# Patient Record
Sex: FEMALE | Race: WHITE | Employment: FULL TIME | URBAN - METROPOLITAN AREA
[De-identification: names, ages, dates, MRNs, and addresses within clinical notes are randomized per-mention and may not be internally consistent; named-entity substitution may affect disease eponyms.]

---

## 2023-09-01 ENCOUNTER — HOSPITAL ENCOUNTER (OUTPATIENT)
Age: 33
Discharge: HOME | End: 2023-09-01
Payer: COMMERCIAL

## 2023-09-01 DIAGNOSIS — R10.11 ABDOMINAL PAIN, RIGHT UPPER QUADRANT: ICD-10-CM

## 2023-09-01 PROCEDURE — 74018 RADEX ABDOMEN 1 VIEW: CPT

## 2023-09-21 ENCOUNTER — HOSPITAL ENCOUNTER (OUTPATIENT)
Age: 33
Discharge: HOME | End: 2023-09-21
Payer: COMMERCIAL

## 2023-09-21 DIAGNOSIS — R14.2 BELCHING: ICD-10-CM

## 2023-09-21 DIAGNOSIS — R10.11 RUQ PAIN: ICD-10-CM

## 2023-09-21 DIAGNOSIS — R19.8 CHANGE IN BOWEL FUNCTION: ICD-10-CM

## 2023-09-21 PROCEDURE — 76705 ECHO EXAM OF ABDOMEN: CPT

## 2024-01-02 ENCOUNTER — OFFICE VISIT (OUTPATIENT)
Age: 34
End: 2024-01-02
Payer: COMMERCIAL

## 2024-01-02 VITALS
DIASTOLIC BLOOD PRESSURE: 76 MMHG | BODY MASS INDEX: 23.31 KG/M2 | HEIGHT: 68 IN | SYSTOLIC BLOOD PRESSURE: 110 MMHG | HEART RATE: 85 BPM | WEIGHT: 153.8 LBS

## 2024-01-02 DIAGNOSIS — O01.9 GESTATIONAL TROPHOBLASTIC DISEASE: Primary | ICD-10-CM

## 2024-01-02 PROCEDURE — G8420 CALC BMI NORM PARAMETERS: HCPCS | Performed by: OBSTETRICS & GYNECOLOGY

## 2024-01-02 PROCEDURE — G8428 CUR MEDS NOT DOCUMENT: HCPCS | Performed by: OBSTETRICS & GYNECOLOGY

## 2024-01-02 PROCEDURE — 99205 OFFICE O/P NEW HI 60 MIN: CPT | Performed by: OBSTETRICS & GYNECOLOGY

## 2024-01-02 PROCEDURE — 1036F TOBACCO NON-USER: CPT | Performed by: OBSTETRICS & GYNECOLOGY

## 2024-01-02 PROCEDURE — G8484 FLU IMMUNIZE NO ADMIN: HCPCS | Performed by: OBSTETRICS & GYNECOLOGY

## 2024-01-02 RX ORDER — DACTINOMYCIN 0.5 MG/ML
INJECTION, POWDER, LYOPHILIZED, FOR SOLUTION INTRAVENOUS EVERY 24 HOURS
COMMUNITY

## 2024-01-15 NOTE — PROGRESS NOTES
[facility] GYNECOLOGIC ONCOLOGY  [address] [place] [address], [address]  [place] [address]  P [phone]  F [phone]    Office Note  Patient ID:  Name:  Celia Noe  MRN:  [id number]  :  1990/33 y.o.  Date:  2024      HISTORY OF PRESENT ILLNESS:  Ms. Celia Noe is a 33 y.o. female who presents as a new patient as a second opinion for GTN following a molar pregnancy.     History obtained from the patient and from [facility]'s gynecologic oncology ( Dr. Marialuisa Small) clinical notes:  1.  Initially seen on 2023 for suspected anembryonic pregnancy.  Concerns for molar pregnancy on ultrasound.  2 underwent dilation and curettage on 2023.  3.  Rising beta hCG postoperatively: Prior to hysteroscopy D&C on 2023 beta-hCG was greater than 225,000.  On 10/4/2023 beta-hCG was 3474.  On 10/23/2023 it did increase to 20,271.  CT abdomen\pelvis at that time demonstrated enlarged uterus, enhancing irregular mass projecting into the lumen.  CT chest demonstrated multiple solid bilateral pulmonary nodules, suspicious for metastatic disease.  CT head negative.  On 2023 initiated on pulsed actinomycin.   4. Cycle 2 initiated on 2023.  Patient reported that her beta-hCG was rising at this time.  I do not have records of the actual value.  CT abdomen\pelvis obtained on 2023, which demonstrated increase in size of uterine mass and decreased size of pulmonary nodules.    Per the patient, after she had increase in size of uterine mass on 2023,  Dr. Small has recommended stopping dactinomycin and converting to methotrexate.  She has plans to initiate this next week.  She also has plans of a repeat hysteroscopy D&C with Dr. Small.  Of note the patient is 33 years old and would like to maintain her fertility.  The patient self-referred herself here today for a second opinion.      The patient is otherwise without complaints today.      Imaging Review:   CT abdomen\pelvis

## 2024-01-31 PROBLEM — O01.9 GESTATIONAL TROPHOBLASTIC DISEASE: Status: ACTIVE | Noted: 2024-01-31
